# Patient Record
Sex: FEMALE | Race: BLACK OR AFRICAN AMERICAN | NOT HISPANIC OR LATINO | URBAN - METROPOLITAN AREA
[De-identification: names, ages, dates, MRNs, and addresses within clinical notes are randomized per-mention and may not be internally consistent; named-entity substitution may affect disease eponyms.]

---

## 2023-04-30 LAB
CHLAMYDIA TRACH., AMPLIFIED: NEGATIVE
N. GONORRHEA, AMPLIFIED: NEGATIVE
TRICHOMONAS VAGINALIS: NEGATIVE

## 2023-11-01 ENCOUNTER — LAB (OUTPATIENT)
Dept: LAB | Facility: LAB | Age: 28
End: 2023-11-01
Payer: COMMERCIAL

## 2023-12-10 PROBLEM — Z01.419 WELL WOMAN EXAM: Status: ACTIVE | Noted: 2023-12-10

## 2023-12-13 ENCOUNTER — OFFICE VISIT (OUTPATIENT)
Dept: OBSTETRICS AND GYNECOLOGY | Facility: CLINIC | Age: 28
End: 2023-12-13
Payer: COMMERCIAL

## 2023-12-13 VITALS
SYSTOLIC BLOOD PRESSURE: 110 MMHG | DIASTOLIC BLOOD PRESSURE: 60 MMHG | HEIGHT: 62 IN | WEIGHT: 110 LBS | BODY MASS INDEX: 20.24 KG/M2

## 2023-12-13 DIAGNOSIS — Z01.419 WELL WOMAN EXAM: Primary | ICD-10-CM

## 2023-12-13 PROCEDURE — 99395 PREV VISIT EST AGE 18-39: CPT | Performed by: OBSTETRICS & GYNECOLOGY

## 2023-12-13 PROCEDURE — 1036F TOBACCO NON-USER: CPT | Performed by: OBSTETRICS & GYNECOLOGY

## 2023-12-13 ASSESSMENT — ENCOUNTER SYMPTOMS
PSYCHIATRIC NEGATIVE: 1
ENDOCRINE NEGATIVE: 1
RESPIRATORY NEGATIVE: 1
MUSCULOSKELETAL NEGATIVE: 1
NEUROLOGICAL NEGATIVE: 1
CONSTITUTIONAL NEGATIVE: 1
CARDIOVASCULAR NEGATIVE: 1
ALLERGIC/IMMUNOLOGIC NEGATIVE: 1
EYES NEGATIVE: 1
GASTROINTESTINAL NEGATIVE: 1
HEMATOLOGIC/LYMPHATIC NEGATIVE: 1

## 2023-12-13 NOTE — PROGRESS NOTES
Subjective   Patient ID: Soha Hernandes is a 28 y.o. female who presents for Annual Exam.  No partner now.  Wondering about the Mirena.  Working as  now.  Regular exercise.      Mirena is good until 2028.      Review of Systems   Constitutional: Negative.    HENT: Negative.     Eyes: Negative.    Respiratory: Negative.     Cardiovascular: Negative.    Gastrointestinal: Negative.    Endocrine: Negative.    Genitourinary: Negative.    Musculoskeletal: Negative.    Skin: Negative.    Allergic/Immunologic: Negative.    Neurological: Negative.    Hematological: Negative.    Psychiatric/Behavioral: Negative.         Objective   Physical Exam  Constitutional:       Appearance: Normal appearance. She is normal weight.   HENT:      Head: Normocephalic.   Neck:      Thyroid: No thyroid mass or thyromegaly.   Pulmonary:      Effort: Pulmonary effort is normal.   Chest:   Breasts:     Right: Normal.      Left: Normal.   Abdominal:      Palpations: Abdomen is soft.   Genitourinary:     General: Normal vulva.      Exam position: Lithotomy position.      Cervix: Normal.      Uterus: Normal.       Adnexa: Right adnexa normal and left adnexa normal.   Musculoskeletal:         General: Normal range of motion.      Cervical back: Normal range of motion and neck supple.   Lymphadenopathy:      Upper Body:      Right upper body: No supraclavicular or axillary adenopathy.      Left upper body: No supraclavicular or axillary adenopathy.   Skin:     General: Skin is warm and dry.   Neurological:      General: No focal deficit present.      Mental Status: She is alert.   Psychiatric:         Mood and Affect: Mood normal.         Behavior: Behavior normal.         Thought Content: Thought content normal.         Judgment: Judgment normal.         Assessment/Plan   Problem List Items Addressed This Visit             ICD-10-CM       Genitourinary and Reproductive    Well woman exam - Primary Z01.419     Mirena is good until  2028.  Pap is due in 2025.  Declines STI screening.                 Liza Ness MD 12/13/23 5:18 PM

## 2025-01-16 ENCOUNTER — APPOINTMENT (OUTPATIENT)
Dept: OBSTETRICS AND GYNECOLOGY | Facility: CLINIC | Age: 30
End: 2025-01-16
Payer: COMMERCIAL

## 2025-01-28 NOTE — PROGRESS NOTES
"Soha Hernandes is a 29 y.o. here for well woman visit  HPI: health is good    GynHx: Mirena  Pap Hx: lsil  Social History     Substance and Sexual Activity   Sexual Activity Yes    Partners: Male    Birth control/protection: I.U.D.     Current contraception: IUD  STIs: LSIL 2020 repeat negative x 2    Exercise: regular, weights, cardio  Past med hx and past surg hx reviewed and notable for: healthy    Objective   /70   Ht 1.549 m (5' 1\")   Wt 54 kg (119 lb)   BMI 22.48 kg/m²   Physical Exam  Vitals reviewed.   Constitutional:       Appearance: Normal appearance. She is normal weight.   HENT:      Head: Normocephalic.   Neck:      Thyroid: No thyroid mass or thyromegaly.   Pulmonary:      Effort: Pulmonary effort is normal.   Chest:   Breasts:     Right: Normal.      Left: Normal.   Abdominal:      Palpations: Abdomen is soft.   Genitourinary:     General: Normal vulva.      Exam position: Lithotomy position.      Vagina: Normal.      Cervix: Normal.      Uterus: Normal.       Adnexa: Right adnexa normal and left adnexa normal.   Musculoskeletal:         General: Normal range of motion.      Cervical back: Normal range of motion and neck supple.   Lymphadenopathy:      Upper Body:      Right upper body: No supraclavicular or axillary adenopathy.      Left upper body: No supraclavicular or axillary adenopathy.   Skin:     General: Skin is warm and dry.   Neurological:      General: No focal deficit present.      Mental Status: She is alert.   Psychiatric:         Mood and Affect: Mood normal.         Behavior: Behavior normal.         Thought Content: Thought content normal.         Judgment: Judgment normal.          Assessment and Plan:  Problem List Items Addressed This Visit          Ob-Gyn Problems    Well woman exam - Primary    Overview     Thank you for your visit for well woman care.  At your visit, you had a pap smear performed. The results will be available in [facility] in two to three weeks.  You " requested sti testing. The results will be available in [employer] [facility] in 1-2 days.    Your Mirena is good until 2028.  Good luck returning to school to study nursing.          Other Visit Diagnoses       Screening examination for STI        Relevant Orders    Hepatitis BsAg    Hepatitis C Antibody    HIV 1/2 Antigen/Antibody Screen with Reflex to Confirmation    Syphilis Screen with Reflex           Orders Placed This Encounter   Procedures    Hepatitis BsAg     Standing Status:   Future     Number of Occurrences:   1     Standing Expiration Date:   1/29/2026     Order Specific Question:   Release result to [employer]     Answer:   Immediate [1]    Hepatitis C Antibody     Standing Status:   Future     Number of Occurrences:   1     Standing Expiration Date:   1/29/2026     Order Specific Question:   Release result to [facility]     Answer:   Immediate [1]    HIV 1/2 Antigen/Antibody Screen with Reflex to Confirmation     Standing Status:   Future     Number of Occurrences:   1     Standing Expiration Date:   1/29/2026     Order Specific Question:   Release result to [facility]     Answer:   Immediate [1]    Syphilis Screen with Reflex     Standing Status:   Future     Number of Occurrences:   1     Standing Expiration Date:   1/29/2026     Order Specific Question:   Release result to [facility]     Answer:   Immediate [1]

## 2025-01-29 ENCOUNTER — APPOINTMENT (OUTPATIENT)
Dept: OBSTETRICS AND GYNECOLOGY | Facility: CLINIC | Age: 30
End: 2025-01-29
Payer: COMMERCIAL

## 2025-01-29 VITALS
SYSTOLIC BLOOD PRESSURE: 116 MMHG | HEIGHT: 61 IN | DIASTOLIC BLOOD PRESSURE: 70 MMHG | WEIGHT: 119 LBS | BODY MASS INDEX: 22.47 KG/M2

## 2025-01-29 DIAGNOSIS — Z11.3 SCREENING EXAMINATION FOR STI: ICD-10-CM

## 2025-01-29 DIAGNOSIS — Z01.419 WELL WOMAN EXAM: Primary | ICD-10-CM

## 2025-01-29 PROCEDURE — 99395 PREV VISIT EST AGE 18-39: CPT | Performed by: OBSTETRICS & GYNECOLOGY

## 2025-01-29 PROCEDURE — 3008F BODY MASS INDEX DOCD: CPT | Performed by: OBSTETRICS & GYNECOLOGY

## 2025-01-29 PROCEDURE — 87661 TRICHOMONAS VAGINALIS AMPLIF: CPT

## 2025-01-29 PROCEDURE — 87491 CHLMYD TRACH DNA AMP PROBE: CPT

## 2025-01-29 PROCEDURE — 87591 N.GONORRHOEAE DNA AMP PROB: CPT

## 2025-01-29 PROCEDURE — 1036F TOBACCO NON-USER: CPT | Performed by: OBSTETRICS & GYNECOLOGY

## 2025-01-29 RX ORDER — LEVONORGESTREL 52 MG/1
INTRAUTERINE DEVICE INTRAUTERINE
COMMUNITY
Start: 2017-05-11

## 2025-01-29 RX ORDER — DOXYCYCLINE 100 MG/1
100 CAPSULE ORAL 2 TIMES DAILY
COMMUNITY
Start: 2025-01-14

## 2025-01-29 ASSESSMENT — PAIN SCALES - GENERAL: PAINLEVEL_OUTOF10: 0-NO PAIN

## 2025-01-30 LAB — T VAGINALIS RRNA SPEC QL NAA+PROBE: NEGATIVE

## 2025-01-31 ENCOUNTER — TELEPHONE (OUTPATIENT)
Dept: OBSTETRICS AND GYNECOLOGY | Facility: CLINIC | Age: 30
End: 2025-01-31
Payer: COMMERCIAL

## 2025-01-31 DIAGNOSIS — A74.9 CHLAMYDIA: Primary | ICD-10-CM

## 2025-01-31 DIAGNOSIS — Z11.3 SCREENING EXAMINATION FOR STI: ICD-10-CM

## 2025-01-31 LAB
C TRACH RRNA SPEC QL NAA+PROBE: POSITIVE
N GONORRHOEA DNA SPEC QL PROBE+SIG AMP: NEGATIVE

## 2025-01-31 RX ORDER — DOXYCYCLINE 100 MG/1
100 CAPSULE ORAL 2 TIMES DAILY
Qty: 14 CAPSULE | Refills: 0 | Status: SHIPPED | OUTPATIENT
Start: 2025-01-31 | End: 2025-02-07

## 2025-01-31 NOTE — TELEPHONE ENCOUNTER
Attempted to call pt to discuss + chlamydia result and treatment plan.  Got her voice mail.  Message left to call office.

## 2025-01-31 NOTE — TELEPHONE ENCOUNTER
----- Message from Liza Ness sent at 1/31/2025  8:57 AM EST -----  Regarding: Call the patient and let her know the results, please.  Call the patient and let her know the results, please.  Per the computer, she has an order for doxy already, so could you please ask her about that.  ----- Message -----  From: [facility] Results In  Sent: 1/30/2025   1:07 PM EST  To: Liza Ness MD

## 2025-02-01 LAB
HBV SURFACE AG SERPL QL IA: NORMAL
HCV AB SERPL QL IA: NORMAL
HIV 1+2 AB+HIV1 P24 AG SERPL QL IA: NORMAL
T PALLIDUM AB SER QL IA: NEGATIVE

## 2025-02-03 DIAGNOSIS — A74.9 CHLAMYDIA INFECTION: Primary | ICD-10-CM

## 2025-02-03 RX ORDER — AZITHROMYCIN 500 MG/1
1000 TABLET, FILM COATED ORAL ONCE
Qty: 2 TABLET | Refills: 0 | Status: SHIPPED | OUTPATIENT
Start: 2025-02-03 | End: 2025-02-03

## 2025-02-03 NOTE — TELEPHONE ENCOUNTER
Late entry:  pt returned call to office late Friday afternoon.  Pt informed of + chlamydia result.   Discussed treatment plan per dr minaya.   Pt confirmed she has been taking doxycyline bid since 1/14/25 for acne, prescribed by dermatologist.  Pt has been having unprotected sex up until this past Friday.  Pt informed not to have sex with partner until he is treated.  EPT declined.  Nurse will make provider aware.

## 2025-02-03 NOTE — TELEPHONE ENCOUNTER
Pt contacted.    Pt was provided with dr minaya'a advice to take azithromycin instead.    Aware to repeat chlamydia test in 3 weeks.   Order for ASTRID placed in system.

## 2025-02-06 LAB
CYTOLOGY CMNT CVX/VAG CYTO-IMP: NORMAL
LAB AP HPV GENOTYPE QUESTION: NO
LAB AP HPV HR: NORMAL
LAB AP PAP ADDITIONAL TESTS: NORMAL
LABORATORY COMMENT REPORT: NORMAL
LABORATORY COMMENT REPORT: NORMAL
PATH REPORT.TOTAL CANCER: NORMAL

## 2025-02-07 ENCOUNTER — TELEPHONE (OUTPATIENT)
Dept: OBSTETRICS AND GYNECOLOGY | Facility: CLINIC | Age: 30
End: 2025-02-07
Payer: COMMERCIAL

## 2025-02-07 NOTE — TELEPHONE ENCOUNTER
Message received from dr minaya.   Pt to be contacted with result,   it was wnl, BV noted.  Check if pt would like to be treated with flagyl

## 2025-02-07 NOTE — TELEPHONE ENCOUNTER
----- Message from Liza Ness sent at 2/7/2025  3:54 PM EST -----  Regarding: Call the patient and let her know the results, please.  Call the patient and let her know the results, please.  ----- Message -----  From: "[employer] Results In  Sent: 1/30/2025   1:07 PM EST  To: Liza Ness MD

## 2025-02-10 ENCOUNTER — TELEPHONE (OUTPATIENT)
Dept: OBSTETRICS AND GYNECOLOGY | Facility: CLINIC | Age: 30
End: 2025-02-10
Payer: COMMERCIAL

## 2025-02-10 DIAGNOSIS — B96.89 BACTERIAL VAGINOSIS: ICD-10-CM

## 2025-02-10 DIAGNOSIS — N76.0 BACTERIAL VAGINOSIS: ICD-10-CM

## 2025-02-10 RX ORDER — METRONIDAZOLE 7.5 MG/G
GEL VAGINAL DAILY
Qty: 70 G | Refills: 0 | Status: SHIPPED | OUTPATIENT
Start: 2025-02-10 | End: 2025-02-15

## 2025-02-10 NOTE — TELEPHONE ENCOUNTER
Pt contacted.   Pt aware of normal pap with bv noted.   Pt would to be treated for it with metrogel.    Order placed in system and sent to dr minaya for approval.

## 2025-07-13 NOTE — PROGRESS NOTES
Subjective   Soha Hernandes is a 29 y.o. female who presents for new patient visit to establish PCP care for annual physical exam.    HPI:    29 y.o. female who presents for new patient visit to establish PCP care for annual physical exam.     [facility] records reviewed    PMHx:  History of chlamydia  Mirena IUD     Healthcare Providers:  GYN: Dr. Ness        Preventive Health Services:  -Last physical: 7/15/2025  -last pap smear: 1/2025 Negative for intraepithelial lesion or malignancy.  -last mammogram: Never; patient denies family history of breast cancer.  Breast cancer screening/mammogram due at age 40 years old  -last colonoscopy/cologuard: Never; patient denies family history of colon cancer or colon polyps.  Colon cancer screening due at age 45 years old  -last STI screening: Negative GC/CT, HIV and syphilis 1/2025  -Hep C screening: Negative 1/2025       Immunizations:  -Childhood vaccines: completed per patient  -updated COVID spike vaccine: NOW DUE  -TDAP: ?  Now due      Immunization History   Administered Date(s) Administered    Moderna SARS-CoV-2 Vaccination 11/09/2021, 12/07/2021                  Today Soha reports:     - doing and feeling well     - Not taking any prescribed medication or over-the-counter herbal supplements t    - She denies illicit drug use    - Sexually active with 1 male partner.  She reports a history of chlamydia for which she was treated.    - LMP:    - Contraception: IUD          Today  she  has no other reported complaints, issues, or problems.  ROS is NEG for HEADACHE, NAUSEA, VOMITING, DIARRHEA, CHEST PAIN, SOB, and BLEEDING.   Review of systems (12) is negative for all systems except for any identified issues in HPI above.      Objective     There were no vitals taken for this visit.     COMPLETE PHYSICAL EXAM    GENERAL           General Appearance: pleasant, well-appearing, well-developed, well-hydrated, well-nourished, well-groomed, NAD .        HEENT            NECK supple, Neg for adneopathy no thyroid enlargement or nodules, Oropharynx normal no exudates. Ears: TMs intact, normal, no effusions.       EYES           Pupils: PERRLA, no photophobia.        HEART           Rate and Rhythm regular rate and rhythm. Heart sounds: normal S1S2. Murmurs: none.        LUNGS           Effort: Normal chest wall, no pectus, Normal air entry all fields, Clear to IPPA, RR<16 with no use of accessory muscles.        BREASTS: Patient declined chaperone           Bilaterally: no masses, no nipple retraction, no nipple discharge, no abnormal skin changes. Axilla: no lymphadenopathy.        BACK           General: unremarkable, no spinal tenderness or rashes.        ABDOMEN           General: soft, nondistended nontender to palpation,  normal to inspection, no HSM, neg for LKKS or masses and BSs heard in all quadrants                 LYMPHATICS           Cervical: none. Axillary: none.        MUSCULOSKELETAL           gross abnormalities no gross abnormalities, no joint redness or swelling.        EXTREMITIES           Varicose veins: not present. Pulses: 2+ bilateral. Clubbing: none. Cyanosis: no.        NEUROLOGICAL           Orientation: alert and oriented x 3. Grossly normal: yes. Plantars: downgoing bilaterally. Muscle Bulk: normal . Cranial Nerves: CN's II-XII grossly intact.        PSYCHOLOGY           Affect: appropriate. Mood: pleasant.        SKIN: No rashes.  No lesions.    Assessment/Plan   Problem List Items Addressed This Visit    None    Annual Physical Exam: Completed today  -labs ordered (see A/P above)     Lipid Screening  -lipid panel ordered     Vitamin D deficiency  -Vit D levels ordered        STI Screening:  -HIV, syphilis, GC/CT/trich ordered    Cervical cancer screening: Up-to-date  - Continue annual well woman visits and Pap smears with GYN          Counseling:      Medication education:        Education:  The patient is counseled regarding potential side-effects of  all new medications        Understanding:  Patient expressed understanding        Adherence:  No barriers to adherence identified        Immunizations Counseling  -? TDAP now due==> declined  -recommend updated COVID spike vaccine  that can be obtained at local pharmacy     FOLLOW-UP: 4 weeks to discuss and review test results    I have personally reviewed all available pertinent labs, imaging, and consult notes with the patient.     Discussed recommended plan of care with patient. Patient expressed understanding and agreement with plan of care. All of patient's  questions were answered at the time. Patient had no additional questions at the time.         Radha Kelly MD, PhD

## 2025-07-15 ENCOUNTER — APPOINTMENT (OUTPATIENT)
Dept: PRIMARY CARE | Facility: CLINIC | Age: 30
End: 2025-07-15
Payer: COMMERCIAL

## 2025-07-15 DIAGNOSIS — Z71.85 IMMUNIZATION COUNSELING: ICD-10-CM

## 2025-07-15 DIAGNOSIS — Z13.220 LIPID SCREENING: ICD-10-CM

## 2025-07-15 DIAGNOSIS — Z00.00 ANNUAL PHYSICAL EXAM: Primary | ICD-10-CM

## 2025-07-15 DIAGNOSIS — E55.9 VITAMIN D DEFICIENCY: ICD-10-CM

## 2025-07-15 DIAGNOSIS — Z11.3 ROUTINE SCREENING FOR STI (SEXUALLY TRANSMITTED INFECTION): ICD-10-CM

## 2025-08-21 ENCOUNTER — APPOINTMENT (OUTPATIENT)
Dept: PRIMARY CARE | Facility: CLINIC | Age: 30
End: 2025-08-21
Payer: COMMERCIAL